# Patient Record
Sex: FEMALE | Race: WHITE | NOT HISPANIC OR LATINO | Employment: OTHER | URBAN - METROPOLITAN AREA
[De-identification: names, ages, dates, MRNs, and addresses within clinical notes are randomized per-mention and may not be internally consistent; named-entity substitution may affect disease eponyms.]

---

## 2022-01-05 ENCOUNTER — HISTORICAL (OUTPATIENT)
Dept: ENDOSCOPY | Facility: HOSPITAL | Age: 82
End: 2022-01-05

## 2022-08-04 RX ORDER — TRAMADOL HYDROCHLORIDE 50 MG/1
50 TABLET ORAL
COMMUNITY
Start: 2022-01-04 | End: 2023-04-19

## 2022-08-04 RX ORDER — DONEPEZIL HYDROCHLORIDE 5 MG/1
5 TABLET, FILM COATED ORAL
COMMUNITY
Start: 2022-01-04

## 2022-08-04 RX ORDER — DULOXETIN HYDROCHLORIDE 60 MG/1
60 CAPSULE, DELAYED RELEASE ORAL DAILY
COMMUNITY
Start: 2022-01-04

## 2022-11-10 DIAGNOSIS — G20.A1 PARKINSON'S DISEASE: Primary | ICD-10-CM

## 2023-04-19 ENCOUNTER — OFFICE VISIT (OUTPATIENT)
Dept: NEUROLOGY | Facility: CLINIC | Age: 83
End: 2023-04-19
Payer: MEDICARE

## 2023-04-19 VITALS
BODY MASS INDEX: 21.99 KG/M2 | HEIGHT: 60 IN | HEART RATE: 84 BPM | SYSTOLIC BLOOD PRESSURE: 118 MMHG | DIASTOLIC BLOOD PRESSURE: 68 MMHG | WEIGHT: 112 LBS

## 2023-04-19 DIAGNOSIS — G20.A1 PARKINSON'S DISEASE: ICD-10-CM

## 2023-04-19 DIAGNOSIS — G47.00 INSOMNIA, UNSPECIFIED TYPE: Primary | ICD-10-CM

## 2023-04-19 DIAGNOSIS — F41.0 PANIC DISORDER: ICD-10-CM

## 2023-04-19 PROCEDURE — 99205 OFFICE O/P NEW HI 60 MIN: CPT | Mod: S$PBB,,, | Performed by: PSYCHIATRY & NEUROLOGY

## 2023-04-19 PROCEDURE — 99999 PR PBB SHADOW E&M-EST. PATIENT-LVL IV: CPT | Mod: PBBFAC,,, | Performed by: PSYCHIATRY & NEUROLOGY

## 2023-04-19 PROCEDURE — 99205 PR OFFICE/OUTPT VISIT, NEW, LEVL V, 60-74 MIN: ICD-10-PCS | Mod: S$PBB,,, | Performed by: PSYCHIATRY & NEUROLOGY

## 2023-04-19 PROCEDURE — 99999 PR PBB SHADOW E&M-EST. PATIENT-LVL IV: ICD-10-PCS | Mod: PBBFAC,,, | Performed by: PSYCHIATRY & NEUROLOGY

## 2023-04-19 PROCEDURE — 99214 OFFICE O/P EST MOD 30 MIN: CPT | Mod: PBBFAC | Performed by: PSYCHIATRY & NEUROLOGY

## 2023-04-19 RX ORDER — CARBIDOPA AND LEVODOPA 25; 100 MG/1; MG/1
1.5 TABLET ORAL 3 TIMES DAILY
Qty: 135 TABLET | Refills: 5 | Status: SHIPPED | OUTPATIENT
Start: 2023-04-19 | End: 2023-09-18

## 2023-04-19 RX ORDER — CYPROHEPTADINE HYDROCHLORIDE 4 MG/1
4 TABLET ORAL 2 TIMES DAILY
COMMUNITY

## 2023-04-19 RX ORDER — CARBIDOPA AND LEVODOPA 25; 100 MG/1; MG/1
1 TABLET ORAL 3 TIMES DAILY
COMMUNITY
End: 2023-04-19 | Stop reason: SDUPTHER

## 2023-04-19 RX ORDER — ALPRAZOLAM 0.25 MG/1
0.25 TABLET ORAL DAILY PRN
Qty: 30 TABLET | Refills: 1 | Status: SHIPPED | OUTPATIENT
Start: 2023-04-19 | End: 2023-05-19

## 2023-04-19 RX ORDER — MIRTAZAPINE 7.5 MG/1
7.5 TABLET, FILM COATED ORAL NIGHTLY
Qty: 30 TABLET | Refills: 11 | Status: SHIPPED | OUTPATIENT
Start: 2023-04-19 | End: 2024-03-12

## 2023-04-19 RX ORDER — AMLODIPINE BESYLATE 10 MG/1
10 TABLET ORAL DAILY
COMMUNITY

## 2023-04-19 RX ORDER — PANTOPRAZOLE SODIUM 40 MG/1
40 TABLET, DELAYED RELEASE ORAL DAILY
COMMUNITY

## 2023-04-19 NOTE — PROGRESS NOTES
Chief Complaint   Patient presents with    Movement disorder     NP: Referred by Dr. Chris Adams for Neuro consult to evaluate for Movement disorder: Diagnosed with parkinson last year. Voice tremors and hands shake. Is having difficulty swallowing. She walks very slow with her feet together.         This is a 82 y.o. female  here for parkinson's disease.  She she is Estonian-speaking and here with her son and daughter.  Family helping with interpretation.   Diagnosed with Parkinson's disease last year.  Son reports that her initial symptoms were difficulty with memory.  Patient on repeat herself or forget details of conversations.  Gait has changed that she is walking slower and taking smaller steps.  She is also developed a tremor in her jaw as well as in the hands.  She is having pain in the right shoulder that radiates down to her biceps.  Also complaining of arthritic pain in the joints of her hands, especially the CMC joint.  She was started on Sinemet it is taking 1 pill twice daily.  She has a lot of anxiety and often has panic episodes when she clenches her jaw.  She has frequent clenching of the jaw as well as drooling of the mouth.  Has difficulty swallowing.  Having difficulty falling asleep sometimes will stay up till 3 in the morning.  Is also on Aricept she is taking 5 daily.    Medication List with Changes/Refills   New Medications    ALPRAZOLAM (XANAX) 0.25 MG TABLET    Take 1 tablet (0.25 mg total) by mouth daily as needed for Anxiety.    MIRTAZAPINE (REMERON) 7.5 MG TAB    Take 1 tablet (7.5 mg total) by mouth every evening.   Current Medications    AMLODIPINE (NORVASC) 10 MG TABLET    Take 10 mg by mouth once daily.    CYPROHEPTADINE (PERIACTIN) 4 MG TABLET    Take 4 mg by mouth 2 (two) times a day.    DONEPEZIL (ARICEPT) 5 MG TABLET    Take 5 mg by mouth.    DULOXETINE (CYMBALTA) 60 MG CAPSULE    Take 60 mg by mouth once daily.    PANTOPRAZOLE (PROTONIX) 40 MG TABLET    Take 40 mg by mouth once  daily.   Changed and/or Refilled Medications    Modified Medication Previous Medication    CARBIDOPA-LEVODOPA  MG (SINEMET)  MG PER TABLET carbidopa-levodopa  mg (SINEMET)  mg per tablet       Take 1.5 tablets by mouth 3 (three) times daily.    Take 1 tablet by mouth 3 (three) times daily.   Discontinued Medications    TRAMADOL (ULTRAM) 50 MG TABLET    Take 50 mg by mouth.    VORTIOXETINE (TRINTELLIX) 10 MG TAB    Take 10 mg by mouth.        Past Surgical History:   Procedure Laterality Date    CATARACT EXTRACTION Bilateral     TOTAL KNEE ARTHROPLASTY Bilateral         Past Medical History:   Diagnosis Date    Constipation     Dysphagia     Essential (primary) hypertension     Localized swelling of toe of both feet     Parkinson's disease         Family History   Problem Relation Age of Onset    Cancer Mother     Heart disease Father     Cancer Sister     Prostate cancer Brother         Social History     Socioeconomic History    Marital status:    Tobacco Use    Smoking status: Never    Smokeless tobacco: Never   Substance and Sexual Activity    Alcohol use: Yes     Alcohol/week: 2.0 standard drinks     Types: 2 Shots of liquor per week     Comment: Daily    Drug use: Never          Review of Systems  Review of Systems   Constitutional: Negative for appetite change.   HENT: Negative for sinus pressure and sore throat.    Eyes: Negative for visual disturbance.   Respiratory: Negative for cough and shortness of breath.    Cardiovascular: Negative for chest pain.   Gastrointestinal: Negative for diarrhea and nausea.   Endocrine: Negative for cold intolerance and heat intolerance.   Genitourinary: Negative for dysuria.   Musculoskeletal: Negative for arthralgias and myalgias.   Skin: Negative for rash.   Allergic/Immunologic: Negative for immunocompromised state.   Neurological:        See HPI   Hematological: Does not bruise/bleed easily.   Psychiatric/Behavioral: Negative for  hallucinations.      General: alert and oriented, no acute distress, no audible wheezes, pulse intact, + pedal edema, pain to palp biceps tendon insertion on R    Vitals:    04/19/23 1321   BP: 118/68   Pulse: 84        Cognition and Comprehension  Speech and language intact  Follows commands  Speech fluent  Attention intact  Memory for recent events intact from history taking  Affect pleasant  Fund of knowledge adequate    Anterocollis, jaw tremor, frequent audible jaw clenching, hypophonia, hypomimia  Axial rigidity,    Cranial nerves  II. Optic: Visual fields full to confrontation both eyes  III, IV, VI. Oculomotor: Intact, Pupils equal, round and reactive to light, no nystagmus  V. Trigeminal: sensation to light touch normal  VII. No facial asymmetry or facial weakness  VIII. Hearing intact to spoken voice  IX/X. Glossopharyngeal/Vagus: Voice normal, palate rises symmetrically  XI. Axillary: Shoulder shrug normal  XII. Hypoglossal: Intact    Muscle Strength and Tone  Normal upper extremity tone  Normal lower extremity tone  Normal upper extremity strength  Normal lower extremity strength  Bradykinesia severe LUE.RUE'  Rigidity RUE>LUE    Sensation  Intact to light touch and temperature    Reflexes  Normal and symmetric    Coordination and Gait  Finger to nose normal  Shuffling, stiff gait, post instability      Beata was seen today for movement disorder.    Diagnoses and all orders for this visit:    Insomnia, unspecified type  -     mirtazapine (REMERON) 7.5 MG Tab; Take 1 tablet (7.5 mg total) by mouth every evening.    Parkinson's disease  -     Ambulatory referral/consult to Neurology  -     mirtazapine (REMERON) 7.5 MG Tab; Take 1 tablet (7.5 mg total) by mouth every evening.  -     Ambulatory referral/consult to Home Health  -     carbidopa-levodopa  mg (SINEMET)  mg per tablet; Take 1.5 tablets by mouth 3 (three) times daily.    Panic disorder  -     ALPRAZolam (XANAX) 0.25 MG tablet; Take 1  tablet (0.25 mg total) by mouth daily as needed for Anxiety.          Parkinsons vs MSA vs LBD all discussed. Would increase sinemet to 1.5 TID, trial of Remeron for sleep, xanax only sparingly as needed for panic attack, consider Botox in future for jaw clenching or sialorrhea. Needs home health PT for post instability and risk for fall

## 2023-07-28 ENCOUNTER — TELEPHONE (OUTPATIENT)
Dept: NEUROLOGY | Facility: CLINIC | Age: 83
End: 2023-07-28
Payer: MEDICARE

## 2023-07-28 NOTE — TELEPHONE ENCOUNTER
Patient's daughter (Tracey) called requesting if Dr. Waldrop or JOSELYN Day would have any recommendations for a specialist  that can help the patient with learning how to swallow and speak again nearby? States that can help provide treatment plans for the patient. States she called a place in [place], but don't want to drive that far. States the patient is having pain and discomfort. States PT was unsuccessful. Requesting a call back to further discuss. Please advise.

## 2023-07-31 DIAGNOSIS — R13.10 DYSPHAGIA, UNSPECIFIED TYPE: ICD-10-CM

## 2023-07-31 DIAGNOSIS — R47.9 DIFFICULTY SPEAKING: Primary | ICD-10-CM

## 2023-07-31 NOTE — TELEPHONE ENCOUNTER
Elaine ( daughter ) states that is fine. I did contact [facility] and they do have a ST. I put referral in

## 2023-07-31 NOTE — TELEPHONE ENCOUNTER
We could try sending a referral to [facility] in [place].  I think they have a ST program

## 2024-03-12 DIAGNOSIS — G47.00 INSOMNIA, UNSPECIFIED TYPE: ICD-10-CM

## 2024-03-12 DIAGNOSIS — G20.A1 PARKINSON'S DISEASE: ICD-10-CM

## 2024-03-12 RX ORDER — CARBIDOPA AND LEVODOPA 25; 100 MG/1; MG/1
TABLET ORAL
Qty: 135 TABLET | Refills: 5 | Status: SHIPPED | OUTPATIENT
Start: 2024-03-12 | End: 2024-04-04 | Stop reason: SDUPTHER

## 2024-03-12 RX ORDER — MIRTAZAPINE 7.5 MG/1
7.5 TABLET, FILM COATED ORAL NIGHTLY
Qty: 30 TABLET | Refills: 11 | Status: SHIPPED | OUTPATIENT
Start: 2024-03-12

## 2024-04-04 ENCOUNTER — OFFICE VISIT (OUTPATIENT)
Dept: NEUROLOGY | Facility: CLINIC | Age: 84
End: 2024-04-04
Payer: MEDICARE

## 2024-04-04 VITALS
HEIGHT: 60 IN | WEIGHT: 119 LBS | SYSTOLIC BLOOD PRESSURE: 131 MMHG | BODY MASS INDEX: 23.36 KG/M2 | HEART RATE: 74 BPM | DIASTOLIC BLOOD PRESSURE: 64 MMHG

## 2024-04-04 DIAGNOSIS — G20.A1 PARKINSON'S DISEASE: ICD-10-CM

## 2024-04-04 PROCEDURE — 99214 OFFICE O/P EST MOD 30 MIN: CPT | Mod: S$PBB,,, | Performed by: PSYCHIATRY & NEUROLOGY

## 2024-04-04 PROCEDURE — 99999 PR PBB SHADOW E&M-EST. PATIENT-LVL III: CPT | Mod: PBBFAC,,, | Performed by: PSYCHIATRY & NEUROLOGY

## 2024-04-04 PROCEDURE — 99213 OFFICE O/P EST LOW 20 MIN: CPT | Mod: PBBFAC | Performed by: PSYCHIATRY & NEUROLOGY

## 2024-04-04 RX ORDER — CARBIDOPA AND LEVODOPA 25; 100 MG/1; MG/1
1.5 TABLET ORAL 4 TIMES DAILY
Qty: 180 TABLET | Refills: 5 | Status: SHIPPED | OUTPATIENT
Start: 2024-04-04

## 2024-04-04 NOTE — PROGRESS NOTES
Chief Complaint   Patient presents with    Parkinsons Disease     Increase of sinemet feels it is not lasting long enough, tremor is a little better, feels gate has slightly improved, difficulty tolerating swallowing food is mostly on soft food diet, muscle cramping        This is a 83 y.o. female here for follow up for Parkinson's disease.  Sinemet was increased but family thinks it is not lasting long enough.  Tremor is slightly better.  Gait has slightly improved.  She is having improved ability to swallow food but mostly eats soft foods.  She has gained a few lb since last visit.    Medication List with Changes/Refills   Current Medications    ALPRAZOLAM (XANAX) 0.25 MG TABLET    Take 1 tablet (0.25 mg total) by mouth daily as needed for Anxiety.    AMLODIPINE (NORVASC) 10 MG TABLET    Take 10 mg by mouth once daily.    CYPROHEPTADINE (PERIACTIN) 4 MG TABLET    Take 4 mg by mouth 2 (two) times a day.    DONEPEZIL (ARICEPT) 5 MG TABLET    Take 5 mg by mouth.    DULOXETINE (CYMBALTA) 60 MG CAPSULE    Take 60 mg by mouth once daily.    MIRTAZAPINE (REMERON) 7.5 MG TAB    Take 1 tablet by mouth in the evening    PANTOPRAZOLE (PROTONIX) 40 MG TABLET    Take 40 mg by mouth once daily.   Changed and/or Refilled Medications    Modified Medication Previous Medication    CARBIDOPA-LEVODOPA  MG (SINEMET)  MG PER TABLET carbidopa-levodopa  mg (SINEMET)  mg per tablet       Take 1.5 tablets by mouth 4 (four) times daily.    TAKE 1 & 1/2 (ONE & ONE-HALF) TABLETS BY MOUTH THREE TIMES DAILY        Vitals:    04/04/24 1131   BP: 131/64   Pulse: 74        Cognition and Comprehension  Speech and language intact  Follows commands  Speech fluent  Attention intact  Memory for recent events intact from history taking  Affect pleasant  Fund of knowledge adequate     Anterocollis, jaw tremor, frequent audible jaw clenching, hypophonia, hypomimia  Axial rigidity,     Cranial nerves  II. Optic: Visual fields full  to confrontation both eyes  III, IV, VI. Oculomotor: Intact, Pupils equal, round and reactive to light, no nystagmus  V. Trigeminal: sensation to light touch normal  VII. No facial asymmetry or facial weakness  VIII. Hearing intact to spoken voice  IX/X. Glossopharyngeal/Vagus: Voice normal, palate rises symmetrically  XI. Axillary: Shoulder shrug normal  XII. Hypoglossal: Intact     Muscle Strength and Tone  Normal upper extremity tone  Normal lower extremity tone  Normal upper extremity strength  Normal lower extremity strength  Bradykinesia severe LUE.RUE'  Rigidity RUE>LUE     Sensation  Intact to light touch and temperature     Reflexes  Normal and symmetric     Coordination and Gait  Finger to nose normal  Shuffling, stiff gait, post instability       1. Parkinson's disease  -     carbidopa-levodopa  mg (SINEMET)  mg per tablet; Take 1.5 tablets by mouth 4 (four) times daily.  Dispense: 180 tablet; Refill: 5  -     Ambulatory referral/consult to Physical/Occupational Therapy       MSA vs PD. Increase sinemet to 1.5 QID, take during daytime hours

## 2024-04-15 ENCOUNTER — TELEPHONE (OUTPATIENT)
Dept: NEUROLOGY | Facility: CLINIC | Age: 84
End: 2024-04-15
Payer: MEDICARE

## 2024-04-15 DIAGNOSIS — G20.A1 PARKINSON'S DISEASE, UNSPECIFIED WHETHER DYSKINESIA PRESENT, UNSPECIFIED WHETHER MANIFESTATIONS FLUCTUATE: Primary | ICD-10-CM

## 2024-04-15 NOTE — TELEPHONE ENCOUNTER
Katherine alvarado [facility] is calling to get an order for Speech Therapy for the patient. She states that the patient family requested it. Please advise.   Call Back- [phone]

## 2024-04-28 ENCOUNTER — LAB REQUISITION (OUTPATIENT)
Dept: LAB | Facility: HOSPITAL | Age: 84
End: 2024-04-28
Payer: MEDICARE

## 2024-04-28 DIAGNOSIS — N39.0 URINARY TRACT INFECTION, SITE NOT SPECIFIED: ICD-10-CM

## 2024-04-28 LAB
APPEARANCE UR: CLEAR
BACTERIA #/AREA URNS AUTO: ABNORMAL /HPF
BILIRUB UR QL STRIP.AUTO: NEGATIVE
CAOX CRY URNS QL MICRO: ABNORMAL /HPF
COLOR UR AUTO: YELLOW
GLUCOSE UR QL STRIP.AUTO: NORMAL
HYALINE CASTS #/AREA URNS LPF: ABNORMAL /LPF
KETONES UR QL STRIP.AUTO: NEGATIVE
LEUKOCYTE ESTERASE UR QL STRIP.AUTO: 25
MUCOUS THREADS URNS QL MICRO: ABNORMAL /LPF
NITRITE UR QL STRIP.AUTO: NEGATIVE
PH UR STRIP.AUTO: 5.5 [PH]
PROT UR QL STRIP.AUTO: NEGATIVE
RBC #/AREA URNS AUTO: ABNORMAL /HPF
RBC UR QL AUTO: ABNORMAL
SP GR UR STRIP.AUTO: 1.02 (ref 1–1.03)
SQUAMOUS #/AREA URNS LPF: ABNORMAL /HPF
UROBILINOGEN UR STRIP-ACNC: NORMAL
WBC #/AREA URNS AUTO: ABNORMAL /HPF

## 2024-04-28 PROCEDURE — 81001 URINALYSIS AUTO W/SCOPE: CPT | Performed by: STUDENT IN AN ORGANIZED HEALTH CARE EDUCATION/TRAINING PROGRAM

## 2024-04-28 PROCEDURE — 87086 URINE CULTURE/COLONY COUNT: CPT | Performed by: STUDENT IN AN ORGANIZED HEALTH CARE EDUCATION/TRAINING PROGRAM

## 2024-04-30 LAB — BACTERIA UR CULT: ABNORMAL

## 2024-09-25 DIAGNOSIS — G20.A1 PARKINSON'S DISEASE: ICD-10-CM

## 2024-09-25 RX ORDER — CARBIDOPA AND LEVODOPA 25; 100 MG/1; MG/1
TABLET ORAL
Qty: 180 TABLET | Refills: 5 | Status: SHIPPED | OUTPATIENT
Start: 2024-09-25

## 2024-10-16 ENCOUNTER — OFFICE VISIT (OUTPATIENT)
Dept: NEUROLOGY | Facility: CLINIC | Age: 84
End: 2024-10-16
Payer: MEDICARE

## 2024-10-16 VITALS
SYSTOLIC BLOOD PRESSURE: 102 MMHG | WEIGHT: 117 LBS | BODY MASS INDEX: 22.97 KG/M2 | DIASTOLIC BLOOD PRESSURE: 66 MMHG | HEIGHT: 60 IN | HEART RATE: 72 BPM

## 2024-10-16 DIAGNOSIS — G20.A1 PARKINSON'S DISEASE, UNSPECIFIED WHETHER DYSKINESIA PRESENT, UNSPECIFIED WHETHER MANIFESTATIONS FLUCTUATE: Primary | ICD-10-CM

## 2024-10-16 PROCEDURE — 99999 PR PBB SHADOW E&M-EST. PATIENT-LVL III: CPT | Mod: PBBFAC,,, | Performed by: NURSE PRACTITIONER

## 2024-10-16 PROCEDURE — 99213 OFFICE O/P EST LOW 20 MIN: CPT | Mod: PBBFAC | Performed by: NURSE PRACTITIONER

## 2024-10-16 PROCEDURE — 99214 OFFICE O/P EST MOD 30 MIN: CPT | Mod: S$PBB,,, | Performed by: NURSE PRACTITIONER

## 2024-10-16 RX ORDER — GLYCOPYRROLATE 2 MG/1
2 TABLET ORAL 2 TIMES DAILY
COMMUNITY

## 2024-10-16 RX ORDER — ROPINIROLE HYDROCHLORIDE 2 MG/1
2 TABLET, FILM COATED, EXTENDED RELEASE ORAL 2 TIMES DAILY
COMMUNITY

## 2024-10-16 NOTE — PROGRESS NOTES
Subjective:       Patient ID: Beata Jeronimo is a 83 y.o. female.    Chief Complaint: Parkinson disease (Tremors to hands are better. Hands are stiff. Has difficulty chewing swallowing solids. Family member states food has to be soft. Balance has improved a lot with PT. )      History of Present Illness:  Visit for Parkinson's disease.  Sinemet was increased to 1.5 tablets q.i.d. at last visit as family felt like it was not lasting long enough.  Her son does feel like the increase in medication has helped somewhat.  Tremor is better and gait is better.  She is also still participating in physical therapy which has helped with her gait.  She continues to have difficulty with swallowing in his still working with speech therapy              Past Medical History:   Diagnosis Date    Constipation     Dysphagia     Essential (primary) hypertension     Localized swelling of toe of both feet     Parkinson's disease        Past Surgical History:   Procedure Laterality Date    CATARACT EXTRACTION Bilateral     TOTAL KNEE ARTHROPLASTY Bilateral         Family History   Problem Relation Name Age of Onset    Cancer Mother      Heart disease Father      Cancer Sister      Prostate cancer Brother          Social History     Socioeconomic History    Marital status:    Tobacco Use    Smoking status: Never    Smokeless tobacco: Never   Substance and Sexual Activity    Alcohol use: Yes     Alcohol/week: 2.0 standard drinks of alcohol     Types: 2 Shots of liquor per week     Comment: Daily    Drug use: Never        Outpatient Encounter Medications as of 10/16/2024   Medication Sig Dispense Refill    amLODIPine (NORVASC) 10 MG tablet Take 10 mg by mouth once daily.      carbidopa-levodopa  mg (SINEMET)  mg per tablet TAKE 1 & 1/2 (ONE & ONE-HALF) TABLETS BY MOUTH 4 TIMES DAILY 180 tablet 5    DULoxetine (CYMBALTA) 60 MG capsule Take 60 mg by mouth once daily.      glycopyrrolate (ROBINUL) 2 MG Tab Take 2 mg by  mouth 2 (two) times daily.      mirtazapine (REMERON) 7.5 MG Tab Take 1 tablet by mouth in the evening 30 tablet 11    pantoprazole (PROTONIX) 40 MG tablet Take 40 mg by mouth once daily.      rOPINIRole (REQUIP XL) 2 mg 24 hr tablet Take 2 mg by mouth 2 (two) times a day.      ALPRAZolam (XANAX) 0.25 MG tablet Take 1 tablet (0.25 mg total) by mouth daily as needed for Anxiety. 30 tablet 1    cyproheptadine (PERIACTIN) 4 mg tablet Take 4 mg by mouth 2 (two) times a day. (Patient not taking: Reported on 10/16/2024)      [DISCONTINUED] carbidopa-levodopa  mg (SINEMET)  mg per tablet Take 1.5 tablets by mouth 4 (four) times daily. 180 tablet 5    [DISCONTINUED] donepeziL (ARICEPT) 5 MG tablet Take 5 mg by mouth. (Patient not taking: Reported on 10/16/2024)       No facility-administered encounter medications on file as of 10/16/2024.      Objective:   /66 (BP Location: Left arm, Patient Position: Sitting)   Pulse 72   Ht 5' (1.524 m)   Wt 53.1 kg (117 lb)   BMI 22.85 kg/m²        Physical Exam  NAD  alert and oriented  cognition and perception intact  no aphasia  EOMI  no facial asymmetry  no dysarthria  Anterocollis, jaw tremor, frequent audible jaw clenching, hypophonia, hypomimia  Axial rigidity  Bradykinesia severe LUE.RUE'  Rigidity RUE>LUE      Assessment & Plan:      1. Parkinson's disease, unspecified whether dyskinesia present, unspecified whether manifestations fluctuate  Assessment & Plan:  -increase to sinemet 2 tabs QID.  Continue requip XL   -continue PT and ST            This note was created with the assistance of voice recognition software. There may be transcription errors as a result of using this technology however minimal. Effort has been made to assure accuracy of transcription but any obvious errors or omissions should be clarified with the author of the document.

## 2024-12-23 ENCOUNTER — HOSPITAL ENCOUNTER (OUTPATIENT)
Dept: RADIOLOGY | Facility: CLINIC | Age: 84
Discharge: HOME | End: 2024-12-23
Attending: ORTHOPAEDIC SURGERY
Payer: MEDICARE

## 2024-12-23 ENCOUNTER — OFFICE VISIT (OUTPATIENT)
Dept: ORTHOPEDICS | Facility: CLINIC | Age: 84
End: 2024-12-23
Payer: MEDICARE

## 2024-12-23 VITALS
WEIGHT: 117.06 LBS | HEIGHT: 60 IN | BODY MASS INDEX: 22.98 KG/M2 | SYSTOLIC BLOOD PRESSURE: 125 MMHG | DIASTOLIC BLOOD PRESSURE: 71 MMHG | HEART RATE: 65 BPM

## 2024-12-23 DIAGNOSIS — R52 PAIN: ICD-10-CM

## 2024-12-23 DIAGNOSIS — S32.592A CLOSED BILATERAL FRACTURE OF PUBIC RAMI, INITIAL ENCOUNTER: ICD-10-CM

## 2024-12-23 DIAGNOSIS — M25.511 ACUTE PAIN OF RIGHT SHOULDER: ICD-10-CM

## 2024-12-23 DIAGNOSIS — S32.592A CLOSED BILATERAL FRACTURE OF PUBIC RAMI, INITIAL ENCOUNTER: Primary | ICD-10-CM

## 2024-12-23 DIAGNOSIS — S32.591A CLOSED BILATERAL FRACTURE OF PUBIC RAMI, INITIAL ENCOUNTER: ICD-10-CM

## 2024-12-23 DIAGNOSIS — S32.591A CLOSED BILATERAL FRACTURE OF PUBIC RAMI, INITIAL ENCOUNTER: Primary | ICD-10-CM

## 2024-12-23 PROCEDURE — 73030 X-RAY EXAM OF SHOULDER: CPT | Mod: RT,,, | Performed by: ORTHOPAEDIC SURGERY

## 2024-12-23 PROCEDURE — 72190 X-RAY EXAM OF PELVIS: CPT | Mod: ,,, | Performed by: ORTHOPAEDIC SURGERY

## 2024-12-23 RX ORDER — TRAMADOL HYDROCHLORIDE 50 MG/1
50 TABLET ORAL EVERY 6 HOURS PRN
COMMUNITY
Start: 2024-12-16 | End: 2024-12-23 | Stop reason: SDUPTHER

## 2024-12-23 RX ORDER — TRAMADOL HYDROCHLORIDE 50 MG/1
50 TABLET ORAL EVERY 6 HOURS PRN
Qty: 28 TABLET | Refills: 0 | Status: SHIPPED | OUTPATIENT
Start: 2024-12-23

## 2024-12-23 NOTE — PROGRESS NOTES
Subjective:       Patient ID: Beata Jeronimo is a 84 y.o. female.  Chief Complaint   Patient presents with    Appointment     Patient here today for Rt puboacetabular junction/Right pubic ramas fx from fall at home on 12/11/24. She also hurt her right shoulder with the fall. She is ambulating with a walker with wheels and the help of family. It is very difficult for her to sit and get up from sitting with the pain. Her family feels like her balance has been off more and she's been falling more frequently because she isn't listening to them.        HPI:  History of Present Illness    HPI:  Ms. Jeronimo presents with complaints of pain in the shoulder and pelvis following a fall. Her shoulder pain has been present since the fall, with significant pain on movement. The pelvis pain is described in two locations - the groin area and the gluteal region. Pain has persisted for three months. She has become progressively weaker, spending more time in bed due to fear and pain. Family members are encouraging bed rest. She is now having significant difficulty walking, with the daughter reporting severe mobility limitations. Ms. Jeronimo has a history of Parkinson's disease, complicating the condition. Previous rehabilitation has been postponed due to current circumstances. She expresses a fear of anesthesia. The daughter mentions that the patient has been progressively weakening while confined to bed. Her mobility has significantly decreased, raising concerns about overall health and recovery prospects.    IMAGING:  X-rays of the patient's shoulder revealed a small piece of broken bone. A CT was recommended for further evaluation. X-rays of the pelvis identified two fractures: one in the anterior pelvis and another in the posterior pelvis. The anterior fracture minimally extends into the joint but does not require surgical intervention.    SOCIAL HISTORY:  Ms. Jeronimo consumes one alcoholic beverage.    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      ROS:  Musculoskeletal: +joint pain, denies muscle pain          ROS:  Constitutional: Denies fever chills  Eyes: No change in vision  ENT: No ringing or current infections  CV: No chest pain  Resp: No labored breathing  MSK: Pain evident at site of injury located in HPI,   Integ: No signs of abrasions or lacerations  Neuro: No numbness or tingling  Lymphatic: No swelling outside the area of injury     Current Outpatient Medications on File Prior to Visit   Medication Sig Dispense Refill    amLODIPine (NORVASC) 10 MG tablet Take 10 mg by mouth once daily.      carbidopa-levodopa  mg (SINEMET)  mg per tablet TAKE 1 & 1/2 (ONE & ONE-HALF) TABLETS BY MOUTH 4 TIMES DAILY 180 tablet 5    DULoxetine (CYMBALTA) 60 MG capsule Take 60 mg by mouth once daily.      glycopyrrolate (ROBINUL) 2 MG Tab Take 2 mg by mouth 2 (two) times daily.      mirtazapine (REMERON) 7.5 MG Tab Take 1 tablet by mouth in the evening 30 tablet 11    pantoprazole (PROTONIX) 40 MG tablet Take 40 mg by mouth once daily.      rOPINIRole (REQUIP XL) 2 mg 24 hr tablet Take 2 mg by mouth 2 (two) times a day.      traMADoL (ULTRAM) 50 mg tablet Take 50 mg by mouth every 6 (six) hours as needed.      ALPRAZolam (XANAX) 0.25 MG tablet Take 1 tablet (0.25 mg total) by mouth daily as needed for Anxiety. 30 tablet 1    cyproheptadine (PERIACTIN) 4 mg tablet Take 4 mg by mouth 2 (two) times a day. (Patient not taking: Reported on 12/23/2024)       No current facility-administered medications on file prior to visit.          Objective:      /71   Pulse 65   Ht 5' (1.524 m)   Wt 53.1 kg (117 lb 1 oz)   BMI 22.86 kg/m²   General the patient is alert and oriented x3 no acute distress nontoxic-appearing appropriate affect.    Constitutional: Vital signs are examined and stable.  Resp: No signs of labored breathing              RUE: -Skin:  Numbness no tingling no skin abrasions           -MSK: STR 5/5  "AIN/PIN/Median/Radial/Ulnar motor, decent range of motion           -Neuro:  Sensation intact to light touch C5-T1 dermatomes           -Lymphatic: No signs of  lymphadenopathy,            -CV:  Capillary refill is less than 2 seconds. Radial and ulnar pulses 2/4. Compartments soft and compressible                                   RLE: -Skin:  No abrasions         -MSK: : Hip and Knee F/E, EHL/FHL, Gastroc/Tib anterior Strength 5/5           -Neuro:  Sensation intact to light touch L3-S1 dermatomes           -Lymphatic: No signs of lymphadenopathy           -CV: Capillary refill is less than 2 seconds. DP/PT pulses  2/4. Compartments soft and compressible.   Body mass index is 22.86 kg/m².  Ideal body weight: 45.5 kg (100 lb 4.9 oz)  Adjusted ideal body weight: 48.5 kg (107 lb 0.2 oz)  No results found for: "HGBA1C"  Hgb   Date Value Ref Range Status   08/07/2019 13.5 12.0 - 16.0 gm/dL Final   08/06/2019 14.2 12.0 - 16.0 gm/dL Final     No results found for: "GDICGROQ63MB"  WBC   Date Value Ref Range Status   08/07/2019 12.0 (H) 4.5 - 11.5 x10(3)/mcL Final   08/06/2019 5.6 4.5 - 11.5 x10(3)/mcL Final       Radiology:  Three views pelvis skeletally mature individual CT scan pelvis showing a right LC1 pelvic ring fracture anterior pubic ramus inferior pubic ramus small sacral ala fracture.    Three views right shoulder skeletally mature individual showing a right greater tuberosity fracture displacement unknown chronicity        Assessment:         1. Closed bilateral fracture of pubic rami, initial encounter  X-Ray Pelvis Complete min 3 views    CT Shoulder Without Contrast Right      2. Acute pain of right shoulder                Plan:         No follow-ups on file.    Beata was seen today for appointment.    Diagnoses and all orders for this visit:    Closed bilateral fracture of pubic rami, initial encounter  -     X-Ray Pelvis Complete min 3 views; Future  -     CT Shoulder Without Contrast Right; " Future    Acute pain of right shoulder      Assessment & Plan    PLAN SUMMARY:  - Order outpatient CT of the shoulder to evaluate potential rotator cuff issue  - Follow up in 2-3 weeks after holidays to check progress  - Discuss potential surgical options for shoulder if CT reveals rotator cuff issue  - Encourage use of walker for mobility  - Advise patient to avoid alcohol consumption    FOLLOW UP:  - Follow up in about 2-3 weeks after the holidays to check on progress and ensure nothing is moving.    RETURN TO ACTIVITY:  - Encourage patient to walk around with a walker.    LIFESTYLE:  - Avoid drinking.    IMAGING ORDERS:  - Order CT of the shoulder (outpatient) to evaluate potential rotator cuff issue.    PROCEDURES:  - Discuss potential surgical options for the shoulder if CT reveals a rotator cuff issue.          Patient has a right greater tuberosity fracture which will evaluate CT scan.  Patient also has a right LC1 pelvic ring fracture which we will order fracture care today we will order fracture care for the right shoulder fracture as well.  Patient is already taking tramadol refill that for her coming going forward.  Patient's family not interested in surgical fixation at this time.  Weightbearing as tolerated.     WBAT RUE  WBAT RLE  CT scan shoulder    Order a wheelchair for the patient    This note/OR report was created with the assistance of  voice recognition software or phone  dictation.  There may be transcription errors as a result of using this technology however minimal. Effort has been made to assure accuracy of transcription but any obvious errors or omissions should be clarified with the author of the document.     This note was generated with the assistance of ambient listening technology. Verbal consent was obtained by the patient and accompanying visitor(s) for the recording of patient appointment to facilitate this note. I attest to having reviewed and edited the generated note for  accuracy, though some syntax or spelling errors may persist. Please contact the author of this note for any clarification.       Spenser Lyon DO  Orthopedic Trauma Surgery  12/23/2024      Future Appointments   Date Time Provider Department Center   4/30/2025  2:30 PM Nataly Waldrop MD [address]               30-Dec-2021 03:00:50

## 2025-02-19 ENCOUNTER — HOSPITAL ENCOUNTER (OUTPATIENT)
Dept: RADIOLOGY | Facility: CLINIC | Age: 85
Discharge: HOME | End: 2025-02-19
Attending: ORTHOPAEDIC SURGERY
Payer: MEDICARE

## 2025-02-19 ENCOUNTER — OFFICE VISIT (OUTPATIENT)
Dept: ORTHOPEDICS | Facility: CLINIC | Age: 85
End: 2025-02-19
Payer: MEDICARE

## 2025-02-19 VITALS
HEIGHT: 60 IN | WEIGHT: 116.88 LBS | HEART RATE: 63 BPM | SYSTOLIC BLOOD PRESSURE: 129 MMHG | DIASTOLIC BLOOD PRESSURE: 59 MMHG | BODY MASS INDEX: 22.95 KG/M2

## 2025-02-19 DIAGNOSIS — S32.591D CLOSED BILATERAL FRACTURE OF PUBIC RAMI WITH ROUTINE HEALING, SUBSEQUENT ENCOUNTER: Primary | ICD-10-CM

## 2025-02-19 DIAGNOSIS — S32.592D CLOSED BILATERAL FRACTURE OF PUBIC RAMI WITH ROUTINE HEALING, SUBSEQUENT ENCOUNTER: ICD-10-CM

## 2025-02-19 DIAGNOSIS — M25.811 IMPINGEMENT OF RIGHT SHOULDER: ICD-10-CM

## 2025-02-19 DIAGNOSIS — S32.591D CLOSED BILATERAL FRACTURE OF PUBIC RAMI WITH ROUTINE HEALING, SUBSEQUENT ENCOUNTER: ICD-10-CM

## 2025-02-19 DIAGNOSIS — S42.254D CLOSED NONDISPLACED FRACTURE OF GREATER TUBEROSITY OF RIGHT HUMERUS WITH ROUTINE HEALING, SUBSEQUENT ENCOUNTER: ICD-10-CM

## 2025-02-19 DIAGNOSIS — S32.592D CLOSED BILATERAL FRACTURE OF PUBIC RAMI WITH ROUTINE HEALING, SUBSEQUENT ENCOUNTER: Primary | ICD-10-CM

## 2025-02-19 NOTE — PROGRESS NOTES
Subjective:       Patient ID: Beata Jeronimo is a 84 y.o. female.  Chief Complaint   Patient presents with    Pelvis - Follow-up     5.5 week f/u from pubic rami fx. Ambulates with walker. Reports increase in shoulder pain.        HPI:  History of Present Illness    HPI:  Ms. Jeronimo presents with her son for follow-up regarding multiple health concerns. Her primary current complaint is shoulder pain, which is worse at night when she sleeps. A CT of the shoulder showed no recent fracture, though there is a possibility of an old rotator cuff injury from approximately 20 years ago. She was previously in physical therapy, though it's unclear if this was specifically for her shoulder. She and her family are considering further treatment options for her ongoing shoulder pain.    She previously had a fracture or seizures that have since healed. Her sister usually accompanies her to appointments.    PREVIOUS TREATMENTS:  Ms. Jeronimo has previously undergone physical therapy, although the specific timing and duration are unclear. She has also received treatment for a fracture, which has since healed. However, the exact location of the fracture was not specified.    IMAGING:  A CT of the patient's shoulder was performed, revealing no fracture.    SOCIAL HISTORY:  Ms. Jeronimo expressed familiarity with alcohol use.      ROS:  Musculoskeletal: +joint pain          ROS:  Constitutional: Denies fever chills  Eyes: No change in vision  ENT: No ringing or current infections  CV: No chest pain  Resp: No labored breathing  MSK: Pain evident at site of injury located in HPI,   Integ: No signs of abrasions or lacerations  Neuro: No numbness or tingling  Lymphatic: No swelling outside the area of injury     Current Outpatient Medications on File Prior to Visit   Medication Sig Dispense Refill    amLODIPine (NORVASC) 10 MG tablet Take 10 mg by mouth once daily.      carbidopa-levodopa  mg (SINEMET)  mg per tablet TAKE 1 &  1/2 (ONE & ONE-HALF) TABLETS BY MOUTH 4 TIMES DAILY 180 tablet 5    DULoxetine (CYMBALTA) 60 MG capsule Take 60 mg by mouth once daily.      glycopyrrolate (ROBINUL) 2 MG Tab Take 2 mg by mouth 2 (two) times daily.      mirtazapine (REMERON) 7.5 MG Tab Take 1 tablet by mouth in the evening 30 tablet 11    pantoprazole (PROTONIX) 40 MG tablet Take 40 mg by mouth once daily.      rOPINIRole (REQUIP XL) 2 mg 24 hr tablet Take 2 mg by mouth 2 (two) times a day.      traMADoL (ULTRAM) 50 mg tablet Take 1 tablet (50 mg total) by mouth every 6 (six) hours as needed for Pain. 28 tablet 0    ALPRAZolam (XANAX) 0.25 MG tablet Take 1 tablet (0.25 mg total) by mouth daily as needed for Anxiety. 30 tablet 1    cyproheptadine (PERIACTIN) 4 mg tablet Take 4 mg by mouth 2 (two) times a day. (Patient not taking: Reported on 10/16/2024)       No current facility-administered medications on file prior to visit.          Objective:      BP (!) 129/59   Pulse 63   Ht 5' (1.524 m)   Wt 53 kg (116 lb 13.5 oz)   BMI 22.82 kg/m²   General the patient is alert and oriented x3 no acute distress nontoxic-appearing appropriate affect.    Constitutional: Vital signs are examined and stable.  Resp: No signs of labored breathing              RUE: -Skin:  Pain with internal rotation of the shoulder consistent with impingement         -MSK: STR 5/5 AIN/PIN/Median/Radial/Ulnar motor, decent range of motion           -Neuro:  Sensation intact to light touch C5-T1 dermatomes           -Lymphatic: No signs of  lymphadenopathy,            -CV:  Capillary refill is less than 2 seconds. Radial and ulnar pulses 2/4. Compartments soft and compressible                                   RLE: -Skin:  No abrasions         -MSK: : Hip and Knee F/E, EHL/FHL, Gastroc/Tib anterior Strength 5/5           -Neuro:  Sensation intact to light touch L3-S1 dermatomes           -Lymphatic: No signs of lymphadenopathy           -CV: Capillary refill is less than 2  "seconds. DP/PT pulses  2/4. Compartments soft and compressible.   Body mass index is 22.82 kg/m².  Ideal body weight: 45.5 kg (100 lb 4.9 oz)  Adjusted ideal body weight: 48.5 kg (106 lb 14.8 oz)  No results found for: "HGBA1C"  Hgb   Date Value Ref Range Status   08/07/2019 13.5 12.0 - 16.0 gm/dL Final   08/06/2019 14.2 12.0 - 16.0 gm/dL Final     No results found for: "PADBBXTG86KX"  WBC   Date Value Ref Range Status   08/07/2019 12.0 (H) 4.5 - 11.5 x10(3)/mcL Final   08/06/2019 5.6 4.5 - 11.5 x10(3)/mcL Final       Radiology:  Three views pelvis skeletally mature individual showing pelvis fracture healing    Three views right shoulder skeletally mature individual showing a right greater tuberosity chronic fracture    CT scan right shoulder showing chronic deformity to the greater tuberosity no sign of acute fracture        Assessment:         1. Closed bilateral fracture of pubic rami with routine healing, subsequent encounter  X-Ray Pelvis Complete min 3 views      2. Closed nondisplaced fracture of greater tuberosity of right humerus with routine healing, subsequent encounter  X-ray Shoulder 2 or More Views Right      3. Impingement of right shoulder                Plan:         No follow-ups on file.    Beata was seen today for follow-up.    Diagnoses and all orders for this visit:    Closed bilateral fracture of pubic rami with routine healing, subsequent encounter  -     X-Ray Pelvis Complete min 3 views; Future    Closed nondisplaced fracture of greater tuberosity of right humerus with routine healing, subsequent encounter  -     X-ray Shoulder 2 or More Views Right; Future    Impingement of right shoulder      Assessment & Plan    PLAN SUMMARY:  - Administered steroid injection in patient's shoulder  - Potential for repeat injections up to 4 times per year if effective  - Follow-up next week  - Explained potential benefits, including pain relief for up to 3 months    FOLLOW UP:  - Follow up next " week.    PROCEDURES:  - Administered steroid injection in the patient's shoulder today.  - Ms. Jeronimo tolerated the procedure well.  - Explained potential benefits, including pain relief for up to 3 months.  - Mentioned possibility of repeating the injection up to 4 times per year if effective.          He has been having right shoulder impingement.  The CT scan negative for acute fracture.  Recommend physical therapy and continue to work with her shoulder and her gait.  She has Parkinson's so high fall risk.  This was discussed with the patient's family follow up in 3 months  This note/OR report was created with the assistance of  voice recognition software or phone  dictation.  There may be transcription errors as a result of using this technology however minimal. Effort has been made to assure accuracy of transcription but any obvious errors or omissions should be clarified with the author of the document.     Attention is drawn to the right shoulder.  A preoperative time-out was performed correct person place procedure everyone in the room in agreement.  I marked out anterior lateral portal to the right glenohumeral joint.  We then sterilized the skin we then placed 2 cc of steroid 2 cc of Marcaine into the joint without complication.  Patient tolerated the procedure well right upper extremity was then taken through range of motion.  No signs of complications.    This note was generated with the assistance of ambient listening technology. Verbal consent was obtained by the patient and accompanying visitor(s) for the recording of patient appointment to facilitate this note. I attest to having reviewed and edited the generated note for accuracy, though some syntax or spelling errors may persist. Please contact the author of this note for any clarification.       Spenser Lyon DO  Orthopedic Trauma Surgery  02/19/2025      Future Appointments   Date Time Provider Department Center   4/30/2025  2:30 PM Benjie  Nataly CELIS MD [facility] [address]   5/20/2025 10:00 AM Spenser Lyon DO [facility] [place]

## 2025-02-25 DIAGNOSIS — G47.00 INSOMNIA, UNSPECIFIED TYPE: ICD-10-CM

## 2025-02-25 DIAGNOSIS — G20.A1 PARKINSON'S DISEASE: ICD-10-CM

## 2025-02-25 RX ORDER — MIRTAZAPINE 7.5 MG/1
7.5 TABLET, FILM COATED ORAL NIGHTLY
Qty: 30 TABLET | Refills: 11 | Status: SHIPPED | OUTPATIENT
Start: 2025-02-25

## 2025-03-22 DIAGNOSIS — G20.A1 PARKINSON'S DISEASE: ICD-10-CM

## 2025-03-26 ENCOUNTER — TELEPHONE (OUTPATIENT)
Dept: NEUROLOGY | Facility: CLINIC | Age: 85
End: 2025-03-26
Payer: MEDICARE

## 2025-03-26 DIAGNOSIS — G20.A1 PARKINSON'S DISEASE: ICD-10-CM

## 2025-03-26 RX ORDER — CARBIDOPA AND LEVODOPA 25; 100 MG/1; MG/1
TABLET ORAL
Qty: 180 TABLET | Refills: 0 | OUTPATIENT
Start: 2025-03-26

## 2025-03-26 RX ORDER — CARBIDOPA AND LEVODOPA 25; 100 MG/1; MG/1
1 TABLET ORAL 4 TIMES DAILY
Qty: 180 TABLET | Refills: 0 | Status: SHIPPED | OUTPATIENT
Start: 2025-03-26

## 2025-03-26 NOTE — TELEPHONE ENCOUNTER
Pharmacy needs clarification on sinemet dose. States there are 2 directions on the prescription. Please advise [phone]

## 2025-04-03 DIAGNOSIS — S32.591D CLOSED BILATERAL FRACTURE OF PUBIC RAMI WITH ROUTINE HEALING, SUBSEQUENT ENCOUNTER: Primary | ICD-10-CM

## 2025-04-03 DIAGNOSIS — S32.592D CLOSED BILATERAL FRACTURE OF PUBIC RAMI WITH ROUTINE HEALING, SUBSEQUENT ENCOUNTER: Primary | ICD-10-CM

## 2025-04-15 ENCOUNTER — DOCUMENT SCAN (OUTPATIENT)
Dept: HOME HEALTH SERVICES | Facility: HOSPITAL | Age: 85
End: 2025-04-15
Payer: MEDICARE

## 2025-04-15 ENCOUNTER — EXTERNAL HOME HEALTH (OUTPATIENT)
Dept: HOME HEALTH SERVICES | Facility: HOSPITAL | Age: 85
End: 2025-04-15
Payer: MEDICARE

## 2025-04-29 ENCOUNTER — DOCUMENT SCAN (OUTPATIENT)
Dept: HOME HEALTH SERVICES | Facility: HOSPITAL | Age: 85
End: 2025-04-29
Payer: MEDICARE

## 2025-04-30 ENCOUNTER — OFFICE VISIT (OUTPATIENT)
Dept: NEUROLOGY | Facility: CLINIC | Age: 85
End: 2025-04-30
Payer: MEDICARE

## 2025-04-30 VITALS
DIASTOLIC BLOOD PRESSURE: 69 MMHG | HEIGHT: 60 IN | BODY MASS INDEX: 24.07 KG/M2 | SYSTOLIC BLOOD PRESSURE: 135 MMHG | HEART RATE: 70 BPM | WEIGHT: 122.63 LBS

## 2025-04-30 DIAGNOSIS — G20.A1 PARKINSON'S DISEASE, UNSPECIFIED WHETHER DYSKINESIA PRESENT, UNSPECIFIED WHETHER MANIFESTATIONS FLUCTUATE: ICD-10-CM

## 2025-04-30 PROCEDURE — 99999 PR PBB SHADOW E&M-EST. PATIENT-LVL III: CPT | Mod: PBBFAC,,, | Performed by: PSYCHIATRY & NEUROLOGY

## 2025-04-30 PROCEDURE — 99213 OFFICE O/P EST LOW 20 MIN: CPT | Mod: PBBFAC | Performed by: PSYCHIATRY & NEUROLOGY

## 2025-04-30 PROCEDURE — 99214 OFFICE O/P EST MOD 30 MIN: CPT | Mod: S$PBB,,, | Performed by: PSYCHIATRY & NEUROLOGY

## 2025-04-30 RX ORDER — LEVODOPA AND CARBIDOPA 145; 36.25 MG/1; MG/1
2 CAPSULE, EXTENDED RELEASE ORAL 4 TIMES DAILY
Qty: 240 CAPSULE | Refills: 5 | Status: ACTIVE | OUTPATIENT
Start: 2025-04-30

## 2025-04-30 NOTE — PROGRESS NOTES
Chief Complaint   Patient presents with    Tremors     Pt's daughter states increased shuffling gate, slurred speech         This is a 84 y.o. female here for follow up for  Parkinson's disease versus MSA.  She is here with her daughter today.  She is doing well.  Daughter reports that gait has declined somewhat.  She is often using the walker although she can walk without it.  She fell  several months ago fracturing her hip as well as injuring her shoulder.  She is having more difficulty talking.  The patient tells me it is because she has pain in her teeth and her mouth is dry.  Notably she is on Robinul for drooling.  As well as Periactin    Medication List with Changes/Refills   New Medications    CARBIDOPA-LEVODOPA (RYTARY) 36. MG CPSR    Take 2 capsules by mouth 4 (four) times daily.   Current Medications    ALPRAZOLAM (XANAX) 0.25 MG TABLET    Take 1 tablet (0.25 mg total) by mouth daily as needed for Anxiety.    AMLODIPINE (NORVASC) 10 MG TABLET    Take 10 mg by mouth once daily.    CYPROHEPTADINE (PERIACTIN) 4 MG TABLET    Take 4 mg by mouth 2 (two) times a day.    DULOXETINE (CYMBALTA) 60 MG CAPSULE    Take 60 mg by mouth once daily.    GLYCOPYRROLATE (ROBINUL) 2 MG TAB    Take 2 mg by mouth 2 (two) times daily.    MIRTAZAPINE (REMERON) 7.5 MG TAB    Take 1 tablet by mouth in the evening    PANTOPRAZOLE (PROTONIX) 40 MG TABLET    Take 40 mg by mouth once daily.    ROPINIROLE (REQUIP XL) 2 MG 24 HR TABLET    Take 2 mg by mouth 2 (two) times a day.    TRAMADOL (ULTRAM) 50 MG TABLET    Take 1 tablet (50 mg total) by mouth every 6 (six) hours as needed for Pain.   Discontinued Medications    CARBIDOPA-LEVODOPA  MG (SINEMET)  MG PER TABLET    Take 1 tablet by mouth 4 (four) times daily. TAKE 1 & 1/2 (ONE & ONE-HALF) TABLETS BY MOUTH 4 TIMES DAILY        Vitals:    04/30/25 1439   BP: 135/69   Pulse: 70        NAD  alert and oriented  cognition and perception intact  no aphasia  EOMI  no facial  asymmetry  no dysarthria  Anterocollis, jaw tremor, frequent audible jaw clenching, hypophonia, hypomimia  Axial rigidity  Bradykinesia severe LUE.RUE'  Rigidity RUE>LUE'  Shuffling gait but can walk without walker       1. Parkinson's disease, unspecified whether dyskinesia present, unspecified whether manifestations fluctuate  -     carbidopa-levodopa (RYTARY) 36. mg CpSR; Take 2 capsules by mouth 4 (four) times daily.  Dispense: 240 capsule; Refill: 5       Trial of Rytary  Hold robinul and any other anticholinergic meds that could worsen mouth dryness

## 2025-05-16 ENCOUNTER — DOCUMENT SCAN (OUTPATIENT)
Dept: HOME HEALTH SERVICES | Facility: HOSPITAL | Age: 85
End: 2025-05-16
Payer: MEDICARE

## 2025-07-29 DIAGNOSIS — G25.81 RESTLESS LEGS SYNDROME (RLS): Primary | ICD-10-CM

## 2025-07-29 RX ORDER — ROPINIROLE HYDROCHLORIDE 2 MG/1
2 TABLET, FILM COATED, EXTENDED RELEASE ORAL 2 TIMES DAILY
Qty: 60 TABLET | Refills: 5 | Status: SHIPPED | OUTPATIENT
Start: 2025-07-29